# Patient Record
Sex: MALE | Race: WHITE
[De-identification: names, ages, dates, MRNs, and addresses within clinical notes are randomized per-mention and may not be internally consistent; named-entity substitution may affect disease eponyms.]

---

## 2018-04-10 ENCOUNTER — HOSPITAL ENCOUNTER (INPATIENT)
Dept: HOSPITAL 96 - M.ERS | Age: 33
LOS: 5 days | Discharge: HOME | DRG: 392 | End: 2018-04-15
Attending: INTERNAL MEDICINE | Admitting: INTERNAL MEDICINE
Payer: COMMERCIAL

## 2018-04-10 VITALS — SYSTOLIC BLOOD PRESSURE: 114 MMHG | DIASTOLIC BLOOD PRESSURE: 59 MMHG

## 2018-04-10 VITALS — DIASTOLIC BLOOD PRESSURE: 62 MMHG | SYSTOLIC BLOOD PRESSURE: 115 MMHG

## 2018-04-10 VITALS — BODY MASS INDEX: 23.19 KG/M2 | WEIGHT: 175 LBS | HEIGHT: 72.99 IN

## 2018-04-10 VITALS — SYSTOLIC BLOOD PRESSURE: 123 MMHG | DIASTOLIC BLOOD PRESSURE: 64 MMHG

## 2018-04-10 DIAGNOSIS — K58.0: ICD-10-CM

## 2018-04-10 DIAGNOSIS — K44.9: ICD-10-CM

## 2018-04-10 DIAGNOSIS — E86.0: ICD-10-CM

## 2018-04-10 DIAGNOSIS — M54.9: ICD-10-CM

## 2018-04-10 DIAGNOSIS — G43.A0: ICD-10-CM

## 2018-04-10 DIAGNOSIS — G89.29: ICD-10-CM

## 2018-04-10 DIAGNOSIS — F12.90: ICD-10-CM

## 2018-04-10 DIAGNOSIS — A08.4: Primary | ICD-10-CM

## 2018-04-10 DIAGNOSIS — Z79.899: ICD-10-CM

## 2018-04-10 DIAGNOSIS — B02.9: ICD-10-CM

## 2018-04-10 LAB
ABSOLUTE BASOPHILS: 0.1 THOU/UL (ref 0–0.2)
ABSOLUTE MONOCYTES: 0.7 THOU/UL (ref 0–1.2)
ALBUMIN SERPL-MCNC: 4.3 G/DL (ref 3.4–5)
ALP SERPL-CCNC: 84 U/L (ref 46–116)
ALT SERPL-CCNC: 20 U/L (ref 30–65)
ANION GAP SERPL CALC-SCNC: 11 MMOL/L (ref 7–16)
AST SERPL-CCNC: 15 U/L (ref 15–37)
BASOPHILS NFR BLD AUTO: 1 %
BILIRUB SERPL-MCNC: 0.7 MG/DL
BILIRUB UR-MCNC: NEGATIVE MG/DL
BUN SERPL-MCNC: 15 MG/DL (ref 7–18)
CALCIUM SERPL-MCNC: 9.5 MG/DL (ref 8.5–10.1)
CHLORIDE SERPL-SCNC: 102 MMOL/L (ref 98–107)
CO2 SERPL-SCNC: 26 MMOL/L (ref 21–32)
COLOR UR: YELLOW
CREAT SERPL-MCNC: 1 MG/DL (ref 0.6–1.3)
GLUCOSE SERPL-MCNC: 198 MG/DL (ref 70–99)
GRANULOCYTES NFR BLD MANUAL: 90 %
HCT VFR BLD CALC: 43.7 % (ref 42–52)
HGB BLD-MCNC: 14.6 GM/DL (ref 14–18)
KETONES UR STRIP-MCNC: (no result) MG/DL
LIPASE: 80 U/L (ref 73–393)
LYMPHOCYTES # BLD: 0.6 THOU/UL (ref 0.8–5.3)
LYMPHOCYTES NFR BLD AUTO: 4 %
MCH RBC QN AUTO: 31.2 PG (ref 26–34)
MCHC RBC AUTO-ENTMCNC: 33.4 G/DL (ref 28–37)
MCV RBC: 93.2 FL (ref 80–100)
MONOCYTES NFR BLD: 5 %
MPV: 8.3 FL. (ref 7.2–11.1)
NEUTROPHILS # BLD: 12.6 THOU/UL (ref 1.6–8.1)
NUCLEATED RBCS: 0 /100WBC
OPIATES UR-MCNC: POSITIVE NG/ML
PLATELET # BLD EST: ADEQUATE 10*3/UL
PLATELET COUNT*: 186 THOU/UL (ref 150–400)
POTASSIUM SERPL-SCNC: 3.5 MMOL/L (ref 3.5–5.1)
PROT SERPL-MCNC: 7.7 G/DL (ref 6.4–8.2)
PROT UR QL STRIP: NEGATIVE
RBC # BLD AUTO: 4.69 MIL/UL (ref 4.5–6)
RBC # UR STRIP: NEGATIVE /UL
RBC MORPH BLD: NORMAL
RDW-CV: 12.6 % (ref 10.5–14.5)
SODIUM SERPL-SCNC: 139 MMOL/L (ref 136–145)
SP GR UR STRIP: 1.01 (ref 1–1.03)
THC: POSITIVE
URINE CLARITY: CLEAR
URINE GLUCOSE-RANDOM: NEGATIVE
URINE LEUKOCYTES-REFLEX: NEGATIVE
URINE NITRITE-REFLEX: NEGATIVE
UROBILINOGEN UR STRIP-ACNC: 0.2 E.U./DL (ref 0.2–1)
WBC # BLD AUTO: 14 THOU/UL (ref 4–11)

## 2018-04-10 NOTE — NUR
PATIENT CAME TO THE FLOOR FROM THE ER IN STABLE CONDITION. VITAL SIGNS STABLE
ON ROOM AIR. SOME COMPLAINTS OF PAIN AND NAUSEA STILL. ORIENTED TO ROOM,
ADMISSION ASSESSMENT AND QUESTIONS ANSWERED FOR PATIENT AND WIFE. CALL LIGHT
IS IN REACH, WILL CONTINUE TO MONITOR.

## 2018-04-10 NOTE — PROC
[address]  [address]                    PROCEDURE REPORT              
_______________________________________________________________________________
 
Name:       DANAE NUR                 Room:           [address] [place]  
.R.#:  [id number]      Account #:      [id number]  
Admission:  04/10/18     Attend Phys:    ALIRIO Casiano
Discharge:               Date of Birth:  [birth year]  
         Report #: [id number]
                                                                                
_______________________________________________________________________________
THIS REPORT FOR:  //name//                      
 
For GI report, please see the Provation report in [facility] [employer] content.
 
 
 
 
 
 
 
 
 
 
 
 
 
 
 
 
 
 
 
 
 
 
 
 
 
 
 
 
 
 
 
 
 
 
 
 
 
 
 
 
 
                       
                                        By:                                
                 
D: 04/12/18     _______________________________________
T: 04/13/18 0646Medical Records Staff JOSIE       /AL

## 2018-04-10 NOTE — NUR
PATIENT HAS BEEN ALERT AND ORIENTED TODAY SINCE COMING TO THE FLOOR FROM THE
ER. WIFE IS AT BEDSIDE WITH PATIENT. SOME NAUSEA STILL BUT PATIENT HAS BEEN
SLEEPING ON AND OFF SINCE COMING TO THE FLOOR. FLUIDS RUNNING IN RIGHT AC.
CALL LIGHT IS IN REACH, WILL CONTINUE TO MONITOR.

## 2018-04-11 VITALS — DIASTOLIC BLOOD PRESSURE: 71 MMHG | SYSTOLIC BLOOD PRESSURE: 128 MMHG

## 2018-04-11 VITALS — SYSTOLIC BLOOD PRESSURE: 131 MMHG | DIASTOLIC BLOOD PRESSURE: 67 MMHG

## 2018-04-11 VITALS — DIASTOLIC BLOOD PRESSURE: 73 MMHG | SYSTOLIC BLOOD PRESSURE: 127 MMHG

## 2018-04-11 LAB
ABSOLUTE BASOPHILS: 0 THOU/UL (ref 0–0.2)
ABSOLUTE EOSINOPHILS: 0 THOU/UL (ref 0–0.7)
ABSOLUTE MONOCYTES: 1.2 THOU/UL (ref 0–1.2)
BASOPHILS NFR BLD AUTO: 0.4 %
EOSINOPHIL NFR BLD: 0.3 %
GRANULOCYTES NFR BLD MANUAL: 70.8 %
HCT VFR BLD CALC: 37 % (ref 42–52)
HGB BLD-MCNC: 12.5 GM/DL (ref 14–18)
LYMPHOCYTES # BLD: 1.2 THOU/UL (ref 0.8–5.3)
LYMPHOCYTES NFR BLD AUTO: 14 %
MCH RBC QN AUTO: 31.7 PG (ref 26–34)
MCHC RBC AUTO-ENTMCNC: 33.8 G/DL (ref 28–37)
MCV RBC: 93.9 FL (ref 80–100)
MONOCYTES NFR BLD: 14.5 %
MPV: 8.2 FL. (ref 7.2–11.1)
NEUTROPHILS # BLD: 6.1 THOU/UL (ref 1.6–8.1)
NUCLEATED RBCS: 0 /100WBC
PLATELET COUNT*: 140 THOU/UL (ref 150–400)
RBC # BLD AUTO: 3.94 MIL/UL (ref 4.5–6)
RDW-CV: 13.2 % (ref 10.5–14.5)
WBC # BLD AUTO: 8.6 THOU/UL (ref 4–11)

## 2018-04-11 NOTE — NUR
PT.SLEEPY. INTRODUCED ROLE OF CM. HE STATED HE LIVES WITH HIS WIFE AND KIDS.
IS USUALLY INDEPENDENT. 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
CARE,ETC. DID NOT WANT ANY INFORMATION REGARDING SUBSTANCE ABUSE. DOES
NOT FEEL HE WILL HAVE ANY DISCHARGE NEEDS.

## 2018-04-11 NOTE — NUR
ASSESSMENT COMPLETE. PT REPORTS NAUSEA AND ABDOMINAL PAIN THROUGHOUT THE
NIGHT. PRN PAIN MEDICATIONS GIVEN WITH PARTIAL RELIEF REPORTED BY PATIENT. PT
HAD EPISODE OF VOMMITING AT 0510 THIS AM, ABOUT 100ML OF BILE. PT FLU CAME
BACK NEGATIVE. PT IS ON ROOM AIR WITH ADEQAUTE SATS. PT HAS IV FLUIDS
INFUSING. PT IS UP AD NEHAL. SEE ASSESSMENT AND VITALS FOR OTHER DETAILS. CALL
LIGHT WITHIN REACH, WILL CONTINUE PLAN OF CARE

## 2018-04-11 NOTE — NUR
PATIENT A&OX4, ROOM AIR, IV RIGHT AC FLUIDS INFUSSING. UP AD NEHAL, STEADY GAIT.
C/O HEADACHE, RELIEF WITH MEDICATION. C/O ABD PAIN, PARTIAL RELIEF WITH
MEDICATION. C/O NAUSEA, NO VOMITING. GI HAS BEEN CONSULTED, HAS NOT SEEN
PATIENT YET. NO OTHER CONCERNS AT THIS TIME. APPROPRAITE AND COOPORATIVE WITH
CARE.

## 2018-04-12 VITALS — DIASTOLIC BLOOD PRESSURE: 78 MMHG | SYSTOLIC BLOOD PRESSURE: 123 MMHG

## 2018-04-12 VITALS — DIASTOLIC BLOOD PRESSURE: 70 MMHG | SYSTOLIC BLOOD PRESSURE: 118 MMHG

## 2018-04-12 VITALS — SYSTOLIC BLOOD PRESSURE: 116 MMHG | DIASTOLIC BLOOD PRESSURE: 62 MMHG

## 2018-04-12 VITALS — SYSTOLIC BLOOD PRESSURE: 128 MMHG | DIASTOLIC BLOOD PRESSURE: 78 MMHG

## 2018-04-12 VITALS — DIASTOLIC BLOOD PRESSURE: 74 MMHG | SYSTOLIC BLOOD PRESSURE: 122 MMHG

## 2018-04-12 LAB
ABSOLUTE BASOPHILS: 0 THOU/UL (ref 0–0.2)
ABSOLUTE EOSINOPHILS: 0 THOU/UL (ref 0–0.7)
ABSOLUTE MONOCYTES: 0.9 THOU/UL (ref 0–1.2)
ALBUMIN SERPL-MCNC: 3.4 G/DL (ref 3.4–5)
ALP SERPL-CCNC: 67 U/L (ref 46–116)
ALT SERPL-CCNC: 19 U/L (ref 30–65)
ANION GAP SERPL CALC-SCNC: 8 MMOL/L (ref 7–16)
AST SERPL-CCNC: 13 U/L (ref 15–37)
BASOPHILS NFR BLD AUTO: 0.4 %
BILIRUB SERPL-MCNC: 0.5 MG/DL
BUN SERPL-MCNC: 11 MG/DL (ref 7–18)
CALCIUM SERPL-MCNC: 8.6 MG/DL (ref 8.5–10.1)
CHLORIDE SERPL-SCNC: 103 MMOL/L (ref 98–107)
CO2 SERPL-SCNC: 27 MMOL/L (ref 21–32)
CREAT SERPL-MCNC: 0.8 MG/DL (ref 0.6–1.3)
EOSINOPHIL NFR BLD: 0.3 %
ESR (SEDRATE): 9 MM/HR (ref 0–15)
GLUCOSE SERPL-MCNC: 105 MG/DL (ref 70–99)
GRANULOCYTES NFR BLD MANUAL: 73.6 %
HCT VFR BLD CALC: 38.1 % (ref 42–52)
HGB BLD-MCNC: 13.1 GM/DL (ref 14–18)
LYMPHOCYTES # BLD: 1 THOU/UL (ref 0.8–5.3)
LYMPHOCYTES NFR BLD AUTO: 13.5 %
MCH RBC QN AUTO: 31.7 PG (ref 26–34)
MCHC RBC AUTO-ENTMCNC: 34.3 G/DL (ref 28–37)
MCV RBC: 92.4 FL (ref 80–100)
MONOCYTES NFR BLD: 12.2 %
MPV: 8.7 FL. (ref 7.2–11.1)
NEUTROPHILS # BLD: 5.2 THOU/UL (ref 1.6–8.1)
NUCLEATED RBCS: 0 /100WBC
PLATELET COUNT*: 146 THOU/UL (ref 150–400)
POTASSIUM SERPL-SCNC: 3.6 MMOL/L (ref 3.5–5.1)
PROT SERPL-MCNC: 6.5 G/DL (ref 6.4–8.2)
RBC # BLD AUTO: 4.12 MIL/UL (ref 4.5–6)
RDW-CV: 12.9 % (ref 10.5–14.5)
SODIUM SERPL-SCNC: 138 MMOL/L (ref 136–145)
WBC # BLD AUTO: 7.1 THOU/UL (ref 4–11)

## 2018-04-12 PROCEDURE — 0DJ08ZZ INSPECTION OF UPPER INTESTINAL TRACT, VIA NATURAL OR ARTIFICIAL OPENING ENDOSCOPIC: ICD-10-PCS | Performed by: INTERNAL MEDICINE

## 2018-04-12 NOTE — NUR
ASSESSMENT COMPLETE. PT SLEPT PART OF THE NIGHT. PT REPORTS GI COCTAIL HELPING
HIM SLEEP, REPORTS STILL FEELING NAUSEOUS. PT HAD ONE EPISODE OF VOMITTING
AFTER GETTING UP TO USE THE BATHROOM THIS AM. PT HAS IV FLUIDS INFUSING AND
IS SLEEPING AT THIS TIME. PT IS UP AD NEHAL WITH STEADY GAIT. SEE ASSESSMENT AND
VITALS FOR OTHER DETAILS. CALL LIGHT WITHIN REACH, WILL CONTINUE PLAN OF CARE

## 2018-04-12 NOTE — NUR
PATIENT A&OX4, ROOM AIR, IV RIGHT FOREARM FLUIDS RUNNING. FOR MOST PART OF
DAY, UNTIL ABOUT 1600 PATIENT WAS LETHARGIC AND SLEEPING NOT EASILY WOKEN. GI
COCKTAIL COMPLETED, PATIENT MORE ALERT. NEW DIAGNOSIS OF SHINGLE. POST EGD,
DR. GR SAW PATIENT AT BEDSIDE AND DIAGNSOSED PATIENT WITH POSITIVE
SHINGLES. PATIENT PLACED IN ISOLATION, AND STARTED ON VALCYCLOVIR. LOW GRADE
FEVER TODAY, TYLENOL PRN. PATIENT AND FAMILY HAVE EXPRESSSED THE FEELINGS AND
CONCERNS THAT THEY ARE UNHAPPY WITH CARE. STATES HE HAS BEEN LABLED HAS A
HEAVY MARIJUANA SMOKER AND NO FURTHER ACTION OR TESTS WERE COMPLETED. A CT OF
ABD, ULTRASOUND OF ABD, AND EGD WERE DONE PROIR TO THIS FAMILIES CONCERN.
CHARGE NURSE NOTIFIED OF SITUATION ALONG WITH PHYSICIAN. PHYSICIAN SPOKE WITH
FAMILY AT BEDSIDE.  PATIENT AND FAMILY ARE NOT RECEPTIVE TO REASSURANCE THAT
OTHER TESTS HAVE BEEN COMPLETED. NO OTHER CONCERNS AT THIS TIME.

## 2018-04-13 VITALS — SYSTOLIC BLOOD PRESSURE: 130 MMHG | DIASTOLIC BLOOD PRESSURE: 79 MMHG

## 2018-04-13 VITALS — DIASTOLIC BLOOD PRESSURE: 70 MMHG | SYSTOLIC BLOOD PRESSURE: 118 MMHG

## 2018-04-13 NOTE — NUR
PATIENT DROWSY AND SLEEPING MOST OF THE SHIFT. FAMILY HERE AT BEDSIDE WHEN
DOCTORS ROUNDING. IVF REMAINS INFUSING. SCHED ATIVAN AND ZOFRAN GIVEN AS
ORDERED. PRN MORPHINE GIVEN FOR ABD PAIN. PATIENT HAS POOR APPETITE, PATIENT
GIVEN CLEAR LIQUIDS WHEN REQUESTED. REG DIET IS ORDERED. BED ALARM ON FOR
PATIENT SAFETY, PATIENT UNSTEADY WHEN OUT OF BED. PATIENT HAS SCOPALAMINE
PATCH IN [place]CE. PATIENTS WIFE STATES PATIENT IS DIZZY AND NAUSEATED WHEN OUT
OF BED, WILL NOTIFY GI WHEN ROUDING.

## 2018-04-13 NOTE — NUR
PATIENT SLEEPING IN BED AT BEGINNING OF SHIFT; WIFE AND FATHER AT BEDSIDE.  PT
WITH FLUIDS INFUSING PER DR ORDER.  PT VERY SEDATED AT THIS TIME.   MIDNIGHT/
0100 BENEDRYL/LORAZEPAM HELD DUE TO PT'S DIFFICULTY TO TAKE MEDICATION.  PT'S
CALL LIGHT WENT OFF AND PT UP TO BATHROOM WITH STANDBY TO BATHROOM.  PT
INCONTINENT OF URINE, NEW GOWN GIVEN.  PT ASSISTED BACK TO BED AND GIVEN
BENEDRYL, LORAZEPAM AND MORPHINE.  PT ASLEEP AT THIS TIME.  FREQUENTLY USED
ITEMS AND CALL LIGHT WITHIN REACH.  SIDERAILS UPX2 AND BED ALARM ON.  PT IN
ISOLATION FOR SHINGLES.  WILL CONTINUE TO MONITOR.

## 2018-04-14 VITALS — DIASTOLIC BLOOD PRESSURE: 60 MMHG | SYSTOLIC BLOOD PRESSURE: 108 MMHG

## 2018-04-14 VITALS — DIASTOLIC BLOOD PRESSURE: 80 MMHG | SYSTOLIC BLOOD PRESSURE: 117 MMHG

## 2018-04-14 VITALS — SYSTOLIC BLOOD PRESSURE: 122 MMHG | DIASTOLIC BLOOD PRESSURE: 80 MMHG

## 2018-04-14 VITALS — SYSTOLIC BLOOD PRESSURE: 127 MMHG | DIASTOLIC BLOOD PRESSURE: 69 MMHG

## 2018-04-14 NOTE — NUR
ASSUMED CARE THIS AM.  A/O, LAWRENCE MEDS/CARES WELL, VERY FRUSTRATED WITH CURRENT
SITUATION, FEELS THAT GI SYMPTOMS ARE RELATED TO VIRAL INFECTION, NOT HAVING
ANYTHING TO DO WITH BEING POSITIVE FOR THC.  DID HAVE BM EARLIER TODAY,
CONTINENT OF BOWEL/BLADDER, PROGRESSING TOWARD DISCHARGE GOALS, CALL LIGHT IN
REACH, CONT POC.

## 2018-04-14 NOTE — NUR
PATIENT SLEPT DURING THIS SHIFT.  PT UP TO BATHROOM TO VOID X2.  PT UP WITH
STANDBY ASSIST TO BATHROOM.  PT NAUSEATED EARLY IN THE SHIFT; NO EMESIS.  PT
ABLE TO KEEP LEMON-LIME DOWN WITHOUT NAUSEA.  FLUIDS INFUSING PER DR ORDER.
FREQUENTLY USED ITEMS AND CALL LIGHT WITHIN REACH.  SIDERAILS UPX2 AND BED
ALARM ON.  WILL CONTINUE TO MONITOR.

## 2018-04-15 VITALS — DIASTOLIC BLOOD PRESSURE: 57 MMHG | SYSTOLIC BLOOD PRESSURE: 107 MMHG

## 2018-04-15 VITALS — SYSTOLIC BLOOD PRESSURE: 117 MMHG | DIASTOLIC BLOOD PRESSURE: 80 MMHG

## 2018-04-15 LAB
ALBUMIN SERPL-MCNC: 3.5 G/DL (ref 3.4–5)
ALP SERPL-CCNC: 69 U/L (ref 46–116)
ALT SERPL-CCNC: 45 U/L (ref 30–65)
ANION GAP SERPL CALC-SCNC: 10 MMOL/L (ref 7–16)
AST SERPL-CCNC: 20 U/L (ref 15–37)
BILIRUB SERPL-MCNC: 0.7 MG/DL
BUN SERPL-MCNC: 9 MG/DL (ref 7–18)
CALCIUM SERPL-MCNC: 8.6 MG/DL (ref 8.5–10.1)
CHLORIDE SERPL-SCNC: 104 MMOL/L (ref 98–107)
CO2 SERPL-SCNC: 27 MMOL/L (ref 21–32)
CREAT SERPL-MCNC: 1 MG/DL (ref 0.6–1.3)
GLUCOSE SERPL-MCNC: 87 MG/DL (ref 70–99)
HCT VFR BLD CALC: 37.6 % (ref 42–52)
HGB BLD-MCNC: 12.9 GM/DL (ref 14–18)
MAGNESIUM SERPL-MCNC: 1.8 MG/DL (ref 1.8–2.4)
MCH RBC QN AUTO: 31.6 PG (ref 26–34)
MCHC RBC AUTO-ENTMCNC: 34.2 G/DL (ref 28–37)
MCV RBC: 92.3 FL (ref 80–100)
MPV: 8.4 FL. (ref 7.2–11.1)
PLATELET COUNT*: 168 THOU/UL (ref 150–400)
POTASSIUM SERPL-SCNC: 4 MMOL/L (ref 3.5–5.1)
PROT SERPL-MCNC: 6.2 G/DL (ref 6.4–8.2)
RBC # BLD AUTO: 4.08 MIL/UL (ref 4.5–6)
RDW-CV: 12.7 % (ref 10.5–14.5)
SODIUM SERPL-SCNC: 141 MMOL/L (ref 136–145)
WBC # BLD AUTO: 6.1 THOU/UL (ref 4–11)

## 2018-04-15 NOTE — CON
[address]  [address]                    CONSULTATION                  
_______________________________________________________________________________
 
Name:       DANAE NUR                 Room:           [address] [place]  
M.R.#:  [id number]      Account #:      [id number]  
Admission:  04/10/18     Attend Phys:    ALIRIO Casiano
Discharge:  04/15/18     Date of Birth:  [birth year]  
         Report #: [id number]
                                                                     [id number]  
_______________________________________________________________________________
THIS REPORT FOR:  //name//                      
 
CC: [facility] physician/PCP
    Nayeli Ortiz DO
 
DATE OF SERVICE:  04/11/2018
 
 
REFERRING PHYSICIAN:  Jeronimo Ortiz DO
 
REASON FOR CONSULTATION:  Severe abdominal pain associated with nausea and
vomiting.
 
IMPRESSION:
1.  Intractable nausea, vomiting, abdominal pain with associated dehydration --
suspect cyclic vomiting syndrome.
2.  Chronic THC or marijuana use.
3.  Underlying irritable bowel.
 
RECOMMENDATIONS:
1.  At the present time, we will continue with IV hydration.
2.  We will begin the patient on GI cocktail of Phenergan, Ativan and Benadryl
tonight on a scheduled basis at every 4 hours x 4 doses then p.r.n. thereafter.
3.  We will add scopolamine patch for his nausea and vomiting.
4.  We will check an abdominal ultrasound tomorrow morning.
5.  We will proceed with upper endoscopy tomorrow afternoon with my partner, Dr. Caballero.
6.  If the patient's response to the GI cocktail and all other testing is
negative, will need to be placed on CoQ10, discontinue marijuana use altogether
and may need to be on some amitriptyline to help prevent future recurrences of
the same.
7.  I have discussed these impressions and plans with the patient as well as
wife and they are agreeable to the same.
 
HISTORY OF PRESENT ILLNESS:  The patient is a pleasant 32-year-old white male
who was admitted to hospital overnight with complaints of rather severe upper
abdominal pain with intractable nausea and vomiting, which began on the morning
prior to admission.  He has had similar type episodes in the past, but none had
been this severe.  In between episodes he does fine.  He denies complaints of
any chronic heartburn or indigestion.  He normally does not have a problem with
this abdominal pain, nausea or vomiting.  He thought that maybe it was related
to something that he ate, but just could not keep anything down.  He also had
problem with some body aches and pains and also possibly fevers and chills.  He
does have episodes of irritable bowel and spastic colon, but nothing like this
 
 
 
[place] [address]                    CONSULTATION                  
_______________________________________________________________________________
 
Name:       DANAE NUR DEVON                 Room:           [address]#:  [id number]      Account #:      [id number]  
Admission:  04/10/18     Attend Phys:    ALIRIO Casiano
Discharge:  04/15/18     Date of Birth:  [birth year]  
         Report #: [id number]
                                                                     [id number]  
_______________________________________________________________________________
in the past.  He normally does not have any issues.  He does smoke marijuana,
his last use was within the last few days.
 
ALLERGIES:  None.
 
MEDICATIONS:  At home are none.
 
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 
He has had previous knee surgery.  He had spinal meningitis at age 14 and has
IBS.
 
SOCIAL HISTORY:  He does not smoke tobacco, but does smoke marijuana.  He does
not drink alcohol.
 
FAMILY HISTORY:  Negative.
 
PHYSICAL EXAMINATION:
GENERAL:  Pleasant, but ill-appearing 32-year-old gentleman who just appears
worn out.  He is in the fetal position secondary to the intermittent severe
crampy abdominal pain.
CARDIOPULMONARY:  Revealed a regular rate and rhythm.  His lungs were clear.
ABDOMEN:  Soft, though is diffusely tender.  No rebound or guarding noted.
 
LABORATORY DATA:  From admission revealed a white count of 14.0, hemoglobin
14.6, platelet count 186,000, MCV is 93.2 and RDW 12.6.  Differential is normal.
 Sodium 139, potassium 3.5, chloride 102, bicarb is 26, BUN 15, creatinine 1.0. 
Total bilirubin is 0.7, alkaline phosphatase 84, AST is 15, ALT 20, albumin is
4.3.  Urine drug screen is positive for opiates as well as marijuana.  His
urinalysis was unremarkable.
 
DISCUSSION:  At the present time, the patient has typical signs and symptoms of
cyclic vomiting syndrome.  We will proceed with treatment of the same and make
further recommendations thereafter.
 
 
 
 
 
 
 
 
 
 
 
<ELECTRONICALLY SIGNED>
                                        By:  Aramis Galindo DO          
04/15/18     1958
D: 04/14/18 1139_______________________________________
T: 04/14/18 1832Aramis Galindo DO             /nt

## 2018-04-15 NOTE — NUR
RESUMED  CARE THIS AM.  SEE ASSESSMENT FOR DETAILS, ORDERS RECEIVED FOR
DISCHARGE, APPROVED FOR DISCHARGE BY GI TEAM.  IV ACCESS REMOVED WITHOUT
INCIDENT, PATIENT BATHED INDEPENDENTLY.  DISCHARGE INSTRUCTIONS, FOLLOW UP
APPOINTMENTS AND PRESCRIPTIONS DISCUSSED WITH AND GIVEN TO PATIENT, DENIES
QUESTIONS.   TRANSPORTED VIA WHEELCHAIR TO MAIN ENTRANCE IN STABLE CONDITION.

## 2018-04-15 NOTE — NUR
PT SLEPT MOST OF SHIFT. ASSESSMENT AS DOCUMENTED. MEDS GIVEN PER E-MAR. IV
PATENT, FLUIDS INFUSING. PT STATED THE LESION ON HIS BACK IS STARTING TO HURT
MORE BUT DID NOT REQUEST PAIN MEDS AT THIS TIME. PT REPORTS URINATING MORE
FREQUENTLY. WILL CONTINUE WITH PLAN OF CARE.